# Patient Record
Sex: MALE | Race: WHITE | Employment: FULL TIME | URBAN - METROPOLITAN AREA
[De-identification: names, ages, dates, MRNs, and addresses within clinical notes are randomized per-mention and may not be internally consistent; named-entity substitution may affect disease eponyms.]

---

## 2017-06-28 ENCOUNTER — OFFICE VISIT (OUTPATIENT)
Dept: FAMILY MEDICINE CLINIC | Age: 27
End: 2017-06-28

## 2017-06-28 VITALS
TEMPERATURE: 97.8 F | OXYGEN SATURATION: 100 % | BODY MASS INDEX: 24.91 KG/M2 | HEIGHT: 70 IN | RESPIRATION RATE: 18 BRPM | SYSTOLIC BLOOD PRESSURE: 105 MMHG | HEART RATE: 58 BPM | DIASTOLIC BLOOD PRESSURE: 61 MMHG | WEIGHT: 174 LBS

## 2017-06-28 DIAGNOSIS — L98.9 SKIN LESION OF RIGHT ARM: Primary | ICD-10-CM

## 2017-06-28 NOTE — PROGRESS NOTES
Chief Complaint   Patient presents with    Rash     small rash area on left arm x 2.5 weeks      1. Have you been to the ER, urgent care clinic since your last visit? Hospitalized since your last visit? No    2. Have you seen or consulted any other health care providers outside of the [address] [place] [address] Drive since your last visit? Include any pap smears or colon screening.  No

## 2017-06-28 NOTE — PROGRESS NOTES
[facility]    Subjective:   Lois Quinones is a 23 y.o. male with no significant medical history  CC: Skin Rash  History provided by patient    HPI:  Skin Lesion:  Reports skin lesion on the left forearm about 2 weeks, notes that is has not changed in appearance but was increasingly swollen and has been decreasing in size. Denies pain/tenderness, itching, redness, drainage, bleeding. Patient denies any trauma or scratches known to him. Patient denies fevers, chills. Denies any other illnesses or anyone else in the home with similar lesions. No current outpatient prescriptions on file prior to visit. No current facility-administered medications on file prior to visit. Patient Active Problem List   Diagnosis Code    Itch of eye, left H57.8       Social History     Social History    Marital status: SINGLE     Spouse name: N/A    Number of children: N/A    Years of education: N/A     Occupational History    Not on file. Social History Main Topics    Smoking status: Passive Smoke Exposure - Never Smoker     Types: Cigarettes    Smokeless tobacco: Never Used    Alcohol use 6.0 oz/week     12 Cans of beer per week    Drug use: No    Sexual activity: No     Other Topics Concern    Not on file     Social History Narrative       Review of Systems   Constitutional: Negative for chills and fever. Gastrointestinal: Negative for nausea and vomiting. Skin: Positive for rash. Negative for itching. Objective:     Visit Vitals    /61 (BP 1 Location: Left arm, BP Patient Position: Sitting)    Pulse (!) 58    Temp 97.8 °F (36.6 °C) (Oral)    Resp 18    Ht 5' 10\" (1.778 m)    Wt 174 lb (78.9 kg)    SpO2 100%    BMI 24.97 kg/m2        Physical Exam   Constitutional: He appears well-developed and well-nourished. No distress. Cardiovascular: Normal rate, regular rhythm, normal heart sounds and intact distal pulses.     Pulmonary/Chest: Effort normal and breath sounds normal.   Musculoskeletal:        Left wrist: Normal.   Skin: Skin is warm and dry. On left fore arm, raised mild erythematous area <1 cm diameter. No drainage, healing appearance. Non tender, non fluctuant. Nursing note and vitals reviewed. Pertinent Labs/Studies:      Assessment and orders:       ICD-10-CM ICD-9-CM    1. Skin lesion of right arm L98.9 709.9      Young was seen today for rash. Diagnoses and all orders for this visit:    Skin lesion of right arm: Appears likely irritated/infected hair follicle that likely drained spontaneously without patient knowledge. Patient works with Heating and area, regularly using hands. Advised to use topical abx PRN and to monitor for now. Discussed signs/symptoms to return for, including worsening swelling, pain, spread of erythema, fevers/chills. Patient agreeable to plan      Follow-up Disposition:  Return if symptoms worsen or fail to improve. I have reviewed patient medical and social history and medications. I have reviewed pertinent labs results and other data. I have discussed the diagnosis with the patient and the intended plan as seen in the above orders. The patient has received an after-visit summary and questions were answered concerning future plans. I have discussed medication side effects and warnings with the patient as well.     Maikel Cooper MD  Resident [facility]  06/28/17

## 2025-05-29 NOTE — PROGRESS NOTES
[facility]    Subjective   Adrián Judge is a 34 y.o. male who presents for No chief complaint on file.    Establish Care  Medical problems include:  None  Has not seen a doctor in many years.  Not on any medications.    Concerns today  #? Folliculitis-describing recurrent \"pimples that you cannot pop \"that occur on legs, torso and sometimes face.  Ongoing for a year.  Seems to be triggered with going to the gym.  No regular hot tub use.  Has tried over-the-counter acne treatment.  Does not have any lesions present today.  Does not have any pictures of the lesions.    # Athlete's foot  Ongoing for a year of left foot and great left toe.  Has tried every over-the-counter treatment without much improvement.  Distressed by nail changes.        Diet: no specific diet   Exercise: weights at gym   Tobacco: none  Alcohol: socially   Work: works for heating and air   Fm hx: no cancer hx       Review of Systems   Review of Systems       Medical History  No past medical history on file.    Medications  No current outpatient medications on file.     No current facility-administered medications for this visit.       Immunizations   Immunization History   Administered Date(s) Administered    Influenza Virus Vaccine 11/30/2012    TDaP, ADACEL (age 10y-64y), BOOSTRIX (age 10y+), IM, 0.5mL 06/13/2012       Allergies   Not on File    Objective   Vital Signs  There were no vitals taken for this visit.    Physical Examination  Physical Exam  Physical Exam:  General: No acute distress. Alert. Cooperative.  Head: Normocephalic. Atraumatic.  Mouth: Mucosa pink. Moist mucous membranes.   Respiratory: No increased work of breathing. Symmetric chest rise b/l.  Cardiovascular: RRR. No clubbing or cyanosis  GI: Nondistended. No masses.   Extremities: no LE edema. Moving all extremities spontaneously.  Musculoskeletal: Full ROM in all extremities, no obvious deformities.  Skin:

## 2025-06-12 ENCOUNTER — OFFICE VISIT (OUTPATIENT)
Age: 35
End: 2025-06-12
Payer: COMMERCIAL

## 2025-06-12 VITALS
BODY MASS INDEX: 28.06 KG/M2 | HEIGHT: 70 IN | HEART RATE: 64 BPM | WEIGHT: 196 LBS | RESPIRATION RATE: 18 BRPM | SYSTOLIC BLOOD PRESSURE: 118 MMHG | TEMPERATURE: 98.3 F | DIASTOLIC BLOOD PRESSURE: 74 MMHG | OXYGEN SATURATION: 97 %

## 2025-06-12 DIAGNOSIS — Z11.59 ENCOUNTER FOR HCV SCREENING TEST FOR LOW RISK PATIENT: ICD-10-CM

## 2025-06-12 DIAGNOSIS — Z76.89 ENCOUNTER TO ESTABLISH CARE: ICD-10-CM

## 2025-06-12 DIAGNOSIS — Z13.1 DIABETES MELLITUS SCREENING: ICD-10-CM

## 2025-06-12 DIAGNOSIS — E66.3 OVERWEIGHT: ICD-10-CM

## 2025-06-12 DIAGNOSIS — Z11.4 ENCOUNTER FOR SCREENING FOR HIV: ICD-10-CM

## 2025-06-12 DIAGNOSIS — B35.3 TINEA PEDIS OF BOTH FEET: Primary | ICD-10-CM

## 2025-06-12 DIAGNOSIS — Z13.220 LIPID SCREENING: ICD-10-CM

## 2025-06-12 DIAGNOSIS — Z87.2 HISTORY OF FOLLICULITIS: ICD-10-CM

## 2025-06-12 PROCEDURE — 99204 OFFICE O/P NEW MOD 45 MIN: CPT

## 2025-06-12 RX ORDER — MUPIROCIN 2 %
OINTMENT (GRAM) TOPICAL
Qty: 30 G | Refills: 2 | Status: SHIPPED | OUTPATIENT
Start: 2025-06-12 | End: 2025-06-19

## 2025-06-12 RX ORDER — CICLOPIROX OLAMINE 7.7 MG/G
CREAM TOPICAL
Qty: 90 G | Refills: 1 | Status: SHIPPED | OUTPATIENT
Start: 2025-06-12

## 2025-06-12 SDOH — ECONOMIC STABILITY: FOOD INSECURITY: WITHIN THE PAST 12 MONTHS, YOU WORRIED THAT YOUR FOOD WOULD RUN OUT BEFORE YOU GOT MONEY TO BUY MORE.: NEVER TRUE

## 2025-06-12 SDOH — HEALTH STABILITY: PHYSICAL HEALTH: ON AVERAGE, HOW MANY DAYS PER WEEK DO YOU ENGAGE IN MODERATE TO STRENUOUS EXERCISE (LIKE A BRISK WALK)?: 7 DAYS

## 2025-06-12 SDOH — HEALTH STABILITY: PHYSICAL HEALTH: ON AVERAGE, HOW MANY MINUTES DO YOU ENGAGE IN EXERCISE AT THIS LEVEL?: 60 MIN

## 2025-06-12 SDOH — ECONOMIC STABILITY: FOOD INSECURITY: WITHIN THE PAST 12 MONTHS, THE FOOD YOU BOUGHT JUST DIDN'T LAST AND YOU DIDN'T HAVE MONEY TO GET MORE.: NEVER TRUE

## 2025-06-12 ASSESSMENT — PATIENT HEALTH QUESTIONNAIRE - PHQ9
SUM OF ALL RESPONSES TO PHQ QUESTIONS 1-9: 0
1. LITTLE INTEREST OR PLEASURE IN DOING THINGS: NOT AT ALL
SUM OF ALL RESPONSES TO PHQ QUESTIONS 1-9: 0
2. FEELING DOWN, DEPRESSED OR HOPELESS: NOT AT ALL
SUM OF ALL RESPONSES TO PHQ QUESTIONS 1-9: 0
SUM OF ALL RESPONSES TO PHQ QUESTIONS 1-9: 0

## 2025-06-12 NOTE — PROGRESS NOTES
I saw and evaluated the patient, performing the key elements of the service on the date of service.  I discussed the findings, assessment and plan with the resident and agree with the resident's findings and plan as documented in the resident's note.     Mireya Lundberg MD 6/12/2025

## 2025-06-12 NOTE — PROGRESS NOTES
Room 22    Patient is accompanied by self. I have received verbal consent from Adrián Judge to discuss any/all medical information while they are present in the room.    Identified pt with two pt identifiers(name and ). Reviewed record in preparation for visit and have obtained necessary documentation.  Chief Complaint   Patient presents with    New Patient        - red splotches that appear on the back of his thighs , spreading to arms and face ( no current issue) has not been to [facility]       - Toenail issue and ? Athletes foot ?       - Lump on top of head right side   Health Maintenance Due   Topic    Depression Screen     Varicella vaccine (1 of 2 - 13+ 2-dose series)    HIV screen     Hepatitis C screen     Hepatitis B vaccine (1 of 3 - 19+ 3-dose series)    DTaP/Tdap/Td vaccine (2 - Td or Tdap)    COVID-19 Vaccine ( season)       Vitals:    25 0906   BP: 118/74   Pulse: 64   Resp: 18   Temp: 98.3 °F (36.8 °C)   TempSrc: Temporal   SpO2: 97%   Weight: 88.9 kg (196 lb)   Height: 1.778 m (5' 10\")       Social Determinants Of Health:       SDOH screening completed at visit.  Resources Declined.   See AVS for attached resources, if requested.    Coordination of Care Questionnaire:       \"Have you been to the ER, urgent care clinic since your last visit?  Hospitalized since your last visit?\"    NO    “Have you seen or consulted any other health care providers outside of [facility] since your last visit?”    NO          Click Here for Release of Records Request

## 2025-06-14 LAB
ALBUMIN SERPL-MCNC: 3.9 G/DL (ref 3.5–5)
ALBUMIN/GLOB SERPL: 1.4 (ref 1.1–2.2)
ALP SERPL-CCNC: 71 U/L (ref 45–117)
ALT SERPL-CCNC: 63 U/L (ref 12–78)
ANION GAP SERPL CALC-SCNC: 4 MMOL/L (ref 2–12)
AST SERPL-CCNC: 26 U/L (ref 15–37)
BILIRUB SERPL-MCNC: 0.6 MG/DL (ref 0.2–1)
BUN SERPL-MCNC: 20 MG/DL (ref 6–20)
BUN/CREAT SERPL: 20 (ref 12–20)
CALCIUM SERPL-MCNC: 8.9 MG/DL (ref 8.5–10.1)
CHLORIDE SERPL-SCNC: 109 MMOL/L (ref 97–108)
CHOLEST SERPL-MCNC: 158 MG/DL
CO2 SERPL-SCNC: 28 MMOL/L (ref 21–32)
CREAT SERPL-MCNC: 1.01 MG/DL (ref 0.7–1.3)
ERYTHROCYTE [DISTWIDTH] IN BLOOD BY AUTOMATED COUNT: 12.1 % (ref 11.5–14.5)
EST. AVERAGE GLUCOSE BLD GHB EST-MCNC: 97 MG/DL
GLOBULIN SER CALC-MCNC: 2.7 G/DL (ref 2–4)
GLUCOSE SERPL-MCNC: 95 MG/DL (ref 65–100)
HBA1C MFR BLD: 5 % (ref 4–5.6)
HCT VFR BLD AUTO: 45.2 % (ref 36.6–50.3)
HCV AB SER IA-ACNC: 0.08 INDEX
HCV AB SERPL QL IA: NONREACTIVE
HDLC SERPL-MCNC: 57 MG/DL
HDLC SERPL: 2.8 (ref 0–5)
HGB BLD-MCNC: 14.6 G/DL (ref 12.1–17)
HIV 1+2 AB+HIV1 P24 AG SERPL QL IA: NONREACTIVE
HIV 1/2 RESULT COMMENT: NORMAL
LDLC SERPL CALC-MCNC: 86.6 MG/DL (ref 0–100)
MCH RBC QN AUTO: 30.2 PG (ref 26–34)
MCHC RBC AUTO-ENTMCNC: 32.3 G/DL (ref 30–36.5)
MCV RBC AUTO: 93.4 FL (ref 80–99)
NRBC # BLD: 0 K/UL (ref 0–0.01)
NRBC BLD-RTO: 0 PER 100 WBC
PLATELET # BLD AUTO: 158 K/UL (ref 150–400)
PMV BLD AUTO: 12 FL (ref 8.9–12.9)
POTASSIUM SERPL-SCNC: 4.4 MMOL/L (ref 3.5–5.1)
PROT SERPL-MCNC: 6.6 G/DL (ref 6.4–8.2)
RBC # BLD AUTO: 4.84 M/UL (ref 4.1–5.7)
SODIUM SERPL-SCNC: 141 MMOL/L (ref 136–145)
TRIGL SERPL-MCNC: 72 MG/DL
VLDLC SERPL CALC-MCNC: 14.4 MG/DL
WBC # BLD AUTO: 6.6 K/UL (ref 4.1–11.1)

## 2025-06-16 ENCOUNTER — RESULTS FOLLOW-UP (OUTPATIENT)
Age: 35
End: 2025-06-16